# Patient Record
Sex: MALE | Race: OTHER | HISPANIC OR LATINO | URBAN - METROPOLITAN AREA
[De-identification: names, ages, dates, MRNs, and addresses within clinical notes are randomized per-mention and may not be internally consistent; named-entity substitution may affect disease eponyms.]

---

## 2023-12-15 ENCOUNTER — EMERGENCY (EMERGENCY)
Facility: HOSPITAL | Age: 43
LOS: 1 days | Discharge: HOME | End: 2023-12-15
Attending: EMERGENCY MEDICINE | Admitting: EMERGENCY MEDICINE
Payer: MEDICAID

## 2023-12-15 VITALS
HEIGHT: 62 IN | WEIGHT: 179.9 LBS | RESPIRATION RATE: 18 BRPM | HEART RATE: 63 BPM | SYSTOLIC BLOOD PRESSURE: 124 MMHG | DIASTOLIC BLOOD PRESSURE: 76 MMHG | TEMPERATURE: 98 F | OXYGEN SATURATION: 98 %

## 2023-12-15 VITALS
SYSTOLIC BLOOD PRESSURE: 108 MMHG | RESPIRATION RATE: 18 BRPM | OXYGEN SATURATION: 100 % | DIASTOLIC BLOOD PRESSURE: 71 MMHG | TEMPERATURE: 98 F | HEART RATE: 73 BPM

## 2023-12-15 DIAGNOSIS — R09.81 NASAL CONGESTION: ICD-10-CM

## 2023-12-15 DIAGNOSIS — Z20.822 CONTACT WITH AND (SUSPECTED) EXPOSURE TO COVID-19: ICD-10-CM

## 2023-12-15 DIAGNOSIS — R06.9 UNSPECIFIED ABNORMALITIES OF BREATHING: ICD-10-CM

## 2023-12-15 LAB
ANION GAP SERPL CALC-SCNC: 7 MMOL/L — SIGNIFICANT CHANGE UP (ref 5–17)
ANION GAP SERPL CALC-SCNC: 7 MMOL/L — SIGNIFICANT CHANGE UP (ref 5–17)
BASOPHILS # BLD AUTO: 0.03 K/UL — SIGNIFICANT CHANGE UP (ref 0–0.2)
BASOPHILS # BLD AUTO: 0.03 K/UL — SIGNIFICANT CHANGE UP (ref 0–0.2)
BASOPHILS NFR BLD AUTO: 0.5 % — SIGNIFICANT CHANGE UP (ref 0–2)
BASOPHILS NFR BLD AUTO: 0.5 % — SIGNIFICANT CHANGE UP (ref 0–2)
BUN SERPL-MCNC: 15 MG/DL — SIGNIFICANT CHANGE UP (ref 7–23)
BUN SERPL-MCNC: 15 MG/DL — SIGNIFICANT CHANGE UP (ref 7–23)
CALCIUM SERPL-MCNC: 9.4 MG/DL — SIGNIFICANT CHANGE UP (ref 8.4–10.5)
CALCIUM SERPL-MCNC: 9.4 MG/DL — SIGNIFICANT CHANGE UP (ref 8.4–10.5)
CHLORIDE SERPL-SCNC: 110 MMOL/L — HIGH (ref 96–108)
CHLORIDE SERPL-SCNC: 110 MMOL/L — HIGH (ref 96–108)
CO2 SERPL-SCNC: 25 MMOL/L — SIGNIFICANT CHANGE UP (ref 22–31)
CO2 SERPL-SCNC: 25 MMOL/L — SIGNIFICANT CHANGE UP (ref 22–31)
CREAT SERPL-MCNC: 0.79 MG/DL — SIGNIFICANT CHANGE UP (ref 0.5–1.3)
CREAT SERPL-MCNC: 0.79 MG/DL — SIGNIFICANT CHANGE UP (ref 0.5–1.3)
EGFR: 114 ML/MIN/1.73M2 — SIGNIFICANT CHANGE UP
EGFR: 114 ML/MIN/1.73M2 — SIGNIFICANT CHANGE UP
EOSINOPHIL # BLD AUTO: 0.12 K/UL — SIGNIFICANT CHANGE UP (ref 0–0.5)
EOSINOPHIL # BLD AUTO: 0.12 K/UL — SIGNIFICANT CHANGE UP (ref 0–0.5)
EOSINOPHIL NFR BLD AUTO: 2 % — SIGNIFICANT CHANGE UP (ref 0–6)
EOSINOPHIL NFR BLD AUTO: 2 % — SIGNIFICANT CHANGE UP (ref 0–6)
GLUCOSE SERPL-MCNC: 82 MG/DL — SIGNIFICANT CHANGE UP (ref 70–99)
GLUCOSE SERPL-MCNC: 82 MG/DL — SIGNIFICANT CHANGE UP (ref 70–99)
HCT VFR BLD CALC: 40.7 % — SIGNIFICANT CHANGE UP (ref 39–50)
HCT VFR BLD CALC: 40.7 % — SIGNIFICANT CHANGE UP (ref 39–50)
HGB BLD-MCNC: 13.9 G/DL — SIGNIFICANT CHANGE UP (ref 13–17)
HGB BLD-MCNC: 13.9 G/DL — SIGNIFICANT CHANGE UP (ref 13–17)
IMM GRANULOCYTES NFR BLD AUTO: 0.3 % — SIGNIFICANT CHANGE UP (ref 0–0.9)
IMM GRANULOCYTES NFR BLD AUTO: 0.3 % — SIGNIFICANT CHANGE UP (ref 0–0.9)
LYMPHOCYTES # BLD AUTO: 2.15 K/UL — SIGNIFICANT CHANGE UP (ref 1–3.3)
LYMPHOCYTES # BLD AUTO: 2.15 K/UL — SIGNIFICANT CHANGE UP (ref 1–3.3)
LYMPHOCYTES # BLD AUTO: 36.6 % — SIGNIFICANT CHANGE UP (ref 13–44)
LYMPHOCYTES # BLD AUTO: 36.6 % — SIGNIFICANT CHANGE UP (ref 13–44)
MCHC RBC-ENTMCNC: 29.4 PG — SIGNIFICANT CHANGE UP (ref 27–34)
MCHC RBC-ENTMCNC: 29.4 PG — SIGNIFICANT CHANGE UP (ref 27–34)
MCHC RBC-ENTMCNC: 34.2 GM/DL — SIGNIFICANT CHANGE UP (ref 32–36)
MCHC RBC-ENTMCNC: 34.2 GM/DL — SIGNIFICANT CHANGE UP (ref 32–36)
MCV RBC AUTO: 86.2 FL — SIGNIFICANT CHANGE UP (ref 80–100)
MCV RBC AUTO: 86.2 FL — SIGNIFICANT CHANGE UP (ref 80–100)
MONOCYTES # BLD AUTO: 0.73 K/UL — SIGNIFICANT CHANGE UP (ref 0–0.9)
MONOCYTES # BLD AUTO: 0.73 K/UL — SIGNIFICANT CHANGE UP (ref 0–0.9)
MONOCYTES NFR BLD AUTO: 12.4 % — SIGNIFICANT CHANGE UP (ref 2–14)
MONOCYTES NFR BLD AUTO: 12.4 % — SIGNIFICANT CHANGE UP (ref 2–14)
NEUTROPHILS # BLD AUTO: 2.83 K/UL — SIGNIFICANT CHANGE UP (ref 1.8–7.4)
NEUTROPHILS # BLD AUTO: 2.83 K/UL — SIGNIFICANT CHANGE UP (ref 1.8–7.4)
NEUTROPHILS NFR BLD AUTO: 48.2 % — SIGNIFICANT CHANGE UP (ref 43–77)
NEUTROPHILS NFR BLD AUTO: 48.2 % — SIGNIFICANT CHANGE UP (ref 43–77)
NRBC # BLD: 0 /100 WBCS — SIGNIFICANT CHANGE UP (ref 0–0)
NRBC # BLD: 0 /100 WBCS — SIGNIFICANT CHANGE UP (ref 0–0)
PLATELET # BLD AUTO: 178 K/UL — SIGNIFICANT CHANGE UP (ref 150–400)
PLATELET # BLD AUTO: 178 K/UL — SIGNIFICANT CHANGE UP (ref 150–400)
POTASSIUM SERPL-MCNC: 4 MMOL/L — SIGNIFICANT CHANGE UP (ref 3.5–5.3)
POTASSIUM SERPL-MCNC: 4 MMOL/L — SIGNIFICANT CHANGE UP (ref 3.5–5.3)
POTASSIUM SERPL-SCNC: 4 MMOL/L — SIGNIFICANT CHANGE UP (ref 3.5–5.3)
POTASSIUM SERPL-SCNC: 4 MMOL/L — SIGNIFICANT CHANGE UP (ref 3.5–5.3)
RAPID RVP RESULT: SIGNIFICANT CHANGE UP
RAPID RVP RESULT: SIGNIFICANT CHANGE UP
RBC # BLD: 4.72 M/UL — SIGNIFICANT CHANGE UP (ref 4.2–5.8)
RBC # BLD: 4.72 M/UL — SIGNIFICANT CHANGE UP (ref 4.2–5.8)
RBC # FLD: 12.9 % — SIGNIFICANT CHANGE UP (ref 10.3–14.5)
RBC # FLD: 12.9 % — SIGNIFICANT CHANGE UP (ref 10.3–14.5)
SARS-COV-2 RNA SPEC QL NAA+PROBE: SIGNIFICANT CHANGE UP
SARS-COV-2 RNA SPEC QL NAA+PROBE: SIGNIFICANT CHANGE UP
SODIUM SERPL-SCNC: 142 MMOL/L — SIGNIFICANT CHANGE UP (ref 135–145)
SODIUM SERPL-SCNC: 142 MMOL/L — SIGNIFICANT CHANGE UP (ref 135–145)
WBC # BLD: 5.88 K/UL — SIGNIFICANT CHANGE UP (ref 3.8–10.5)
WBC # BLD: 5.88 K/UL — SIGNIFICANT CHANGE UP (ref 3.8–10.5)
WBC # FLD AUTO: 5.88 K/UL — SIGNIFICANT CHANGE UP (ref 3.8–10.5)
WBC # FLD AUTO: 5.88 K/UL — SIGNIFICANT CHANGE UP (ref 3.8–10.5)

## 2023-12-15 PROCEDURE — 99283 EMERGENCY DEPT VISIT LOW MDM: CPT | Mod: 25

## 2023-12-15 PROCEDURE — 71046 X-RAY EXAM CHEST 2 VIEWS: CPT

## 2023-12-15 PROCEDURE — 85025 COMPLETE CBC W/AUTO DIFF WBC: CPT

## 2023-12-15 PROCEDURE — 71046 X-RAY EXAM CHEST 2 VIEWS: CPT | Mod: 26

## 2023-12-15 PROCEDURE — 80048 BASIC METABOLIC PNL TOTAL CA: CPT

## 2023-12-15 PROCEDURE — 99284 EMERGENCY DEPT VISIT MOD MDM: CPT

## 2023-12-15 PROCEDURE — 0225U NFCT DS DNA&RNA 21 SARSCOV2: CPT

## 2023-12-15 PROCEDURE — 36415 COLL VENOUS BLD VENIPUNCTURE: CPT

## 2023-12-15 NOTE — ED ADULT TRIAGE NOTE - BSA (M2)
Spoke with Dr. oLpez, the following was discussed...  -upon further chart review it appears patient has not been taking Guanfacine on his last admission to [facility]  --would recommend starting at a dose of 1mg daily (not previously mentioned 4mg)  -additionally, would recommend further medical work up: LFTs trending upwards on daily labs 1.83

## 2023-12-15 NOTE — ED ADULT NURSE NOTE - NSFALLUNIVINTERV_ED_ALL_ED
Bed/Stretcher in lowest position, wheels locked, appropriate side rails in place/Call bell, personal items and telephone in reach/Instruct patient to call for assistance before getting out of bed/chair/stretcher/Non-slip footwear applied when patient is off stretcher/[place] to call system/Physically safe environment - no spills, clutter or unnecessary equipment/Purposeful proactive rounding/Room/bathroom lighting operational, light cord in reach Bed/Stretcher in lowest position, wheels locked, appropriate side rails in place/Call bell, personal items and telephone in reach/Instruct patient to call for assistance before getting out of bed/chair/stretcher/Non-slip footwear applied when patient is off stretcher/[place] to call system/Physically safe environment - no spills, clutter or unnecessary equipment/Purposeful proactive rounding/Room/bathroom lighting operational, light cord in reach

## 2023-12-15 NOTE — ED PROVIDER NOTE - PATIENT PORTAL LINK FT
You can access the FollowMyHealth Patient Portal offered by [facility] by registering at the following website: [email or web address]. By joining [employer]’s FollowMyHealth portal, you will also be able to view your health information using other applications (apps) compatible with our system. You can access the FollowMyHealth Patient Portal offered by [facility] by registering at the following website: [email or web address]. By joining [email or web address]’s FollowMyHealth portal, you will also be able to view your health information using other applications (apps) compatible with our system.

## 2023-12-15 NOTE — ED PROVIDER NOTE - OBJECTIVE STATEMENT
42 y/o m with no sig pmh presents to ED with one year hx of chronic nasal congestion, waking up in middle of night with some difficulty breathing - pt tried to follow up with ENT as outpt but was unable to because he lost insurance.  Denies fever, chills.  Has been on zyrtec and other allergy medications which have minimally helped. 44 y/o m with no sig pmh presents to ED with one year hx of chronic nasal congestion, waking up in middle of night with some difficulty breathing - pt tried to follow up with ENT as outpt but was unable to because he lost insurance.  Denies fever, chills.  Has been on zyrtec and other allergy medications which have minimally helped.

## 2023-12-15 NOTE — ED ADULT NURSE NOTE - OBJECTIVE STATEMENT
Pt is 43 y.o male pt walked in c/o dysphagia and throat pian over a year now. PT was seen at an ENT doctor in [place] however pt endorses they don't take his insurance. Pt A&Ox4, conversive in full sentences and ambulates. Pt is speaking in complete sentences, no drooling or RDS noted at present. Assessment ongoing. Pt is 43 y.o male pt walked in c/o dysphagia and throat pian over a year now. PT was seen at an ENT doctor in [place] however pt endorses they don't take his insurance. Pt A&Ox4, conversive in full sentences and ambulates. Pt is speaking in complete sentences, no drooling or RDS noted at present. Assessment ongoing.

## 2023-12-15 NOTE — ED PROVIDER NOTE - NSFOLLOWUPINSTRUCTIONS_ED_ALL_ED_FT
Hill un seguimiento de los recursos para obtener un seguro, anton doc se le proporcionó en el Departamento de Emergencias. Edmund vez que obtenga el seguro, llame a nuestro coordinador de referencias para un seguimiento de otorrinolaringología (el número de teléfono figura en los documentos de honorio).    Goteo posnasal  Postnasal Drip  El goteo posnasal es la sensación de tener mucosidad que baja por la parte posterior de la garganta. La mucosidad es edmund sustancia viscosa que humedece y limpia la nariz y la garganta, así doc las cavidades de aire que se encuentran en los huesos de la rosendo cerca de la frente y las mejillas (senos). Constantemente hay pequeñas cantidades de mucosidad que pasan de la nariz y los senos hacia abajo por la parte posterior de la garganta. [place] es normal. Cuando hay edmund cantidad excesiva de mucosidad o cuando la mucosidad se torna muy espesa, usted puede sentirla.    Las siguientes son algunas causas frecuentes del goteo posnasal:  Más mucosidad debido a lo siguiente:  Un resfrío o gripe.  Alergias.  Aire frío.  Ciertos medicamentos.  Reflujo gastroesofágico.  Más mucosidad que es más espesa debido a lo siguiente:  Sinusitis o infección nasal.  Aire seco.  Alergia a algún alimento.  Siga estas indicaciones en porras casa:  Cómo aliviar el malestar    A person using nasal spray.  Hill gárgaras con edmund mezcla de agua y sal 3 o 4 veces al día, o cuando sea necesario. Para preparar agua con sal, disuelva totalmente de ½ a 1 cucharadita (de 3 a 6 g) de sal en 1 taza (237 ml) de agua tibia.  Si el aire en porras casa es seco, use un humidificador para agregarle humedad.  Use un aerosol salino o edmund lota nasal (neti pot) para juana la nariz (irrigación nasal). Estos métodos pueden ayudar a eliminar mucosidad y mantener las fosas nasales húmedas.  Indicaciones generales    Use los medicamentos de venta farooq y los recetados solamente doc se lo haya indicado el médico.  Siga las instrucciones del médico respecto de las restricciones en las comidas o las bebidas. Es posible que deba evitar la cafeína.  Evite las cosas que sabe que le producen alergia (alérgenos), doc el polvo, el moho, el polen, las mascotas o ciertos alimentos.  Danitza suficiente líquido doc para mantener la orina de color amarillo pálido.  Concurra a todas las visitas de seguimiento. [place] es importante.  Comuníquese con un médico si:  Tiene fiebre.  Presenta dolor de garganta o dificultad para tragar.  Tiene dolor de amanda.  Siente dolor en los senos paranasales o en los oídos.  Tiene tos que no desaparece.  La mucosidad de la nariz se torna espesa y es de color tana o amarillo.  Tiene síntomas de resfrío o gripe que cain más de 10 días.  Resumen  El goteo posnasal es la sensación de tener mucosidad que baja por la parte posterior de la garganta.  Use irrigación nasal o un aerosol nasal para ayudar a eliminar la mucosidad o para mantener las fosas nasales húmedas.  Evite las cosas que sabe que le producen alergia (alérgenos), doc el polvo, el moho, el polen, las mascotas o ciertos alimentos.  Esta información no tiene doc fin reemplazar el consejo del médico. Asegúrese de hacerle al médico cualquier pregunta que tenga.    Document Revised: 12/07/2022 Document Reviewed: 12/07/2022  Elsevier Patient Education © 2023 [employer]  [employer] logo  Terms and Conditions  Privacy Policy  Editorial Policy  All content on this site: Copyright © 2023 Elsevier, its licensors, and contributors. All rights are reserved, including those for text and data mining, AI training, and similar technologies. For all open access content, the Creative Commons licensing terms apply.  Cookies are used by this site. To decline or learn more, visit our Cookies page. Hill un seguimiento de los recursos para obtener un seguro, anton doc se le proporcionó en el Departamento de Emergencias. [place] vez que obtenga el seguro, llame a nuestro coordinador de referencias para un seguimiento de otorrinolaringología (el número de teléfono figura en los documentos de honorio).    Goteo posnasal  Postnasal Drip  El goteo posnasal es la sensación de tener mucosidad que baja por la parte posterior de la garganta. La mucosidad es edmund sustancia viscosa que humedece y limpia la nariz y la garganta, así doc las cavidades de aire que se encuentran en los huesos de la rosendo cerca de la frente y las mejillas (senos). Constantemente hay pequeñas cantidades de mucosidad que pasan de la nariz y los senos hacia abajo por la parte posterior de la garganta. [place] es normal. Cuando hay edmund cantidad excesiva de mucosidad o cuando la mucosidad se torna muy espesa, usted puede sentirla.    Las siguientes son algunas causas frecuentes del goteo posnasal:  Más mucosidad debido a lo siguiente:  Un resfrío o gripe.  Alergias.  Aire frío.  Ciertos medicamentos.  Reflujo gastroesofágico.  Más mucosidad que es más espesa debido a lo siguiente:  Sinusitis o infección nasal.  Aire seco.  Alergia a algún alimento.  Siga estas indicaciones en porras casa:  Cómo aliviar el malestar    A person using nasal spray.  Hill gárgaras con edmund mezcla de agua y sal 3 o 4 veces al día, o cuando sea necesario. Para preparar agua con sal, disuelva totalmente de ½ a 1 cucharadita (de 3 a 6 g) de sal en 1 taza (237 ml) de agua tibia.  Si el aire en porras casa es seco, use un humidificador para agregarle humedad.  Use un aerosol salino o edmund lota nasal (neti pot) para juana la nariz (irrigación nasal). Estos métodos pueden ayudar a eliminar mucosidad y mantener las fosas nasales húmedas.  Indicaciones generales    Use los medicamentos de venta faroqo y los recetados solamente doc se lo haya indicado el médico.  Siga las instrucciones del médico respecto de las restricciones en las comidas o las bebidas. Es posible que deba evitar la cafeína.  Evite las cosas que sabe que le producen alergia (alérgenos), doc el polvo, el moho, el polen, las mascotas o ciertos alimentos.  Danitza suficiente líquido doc para mantener la orina de color amarillo pálido.  Concurra a todas las visitas de seguimiento. [place] es importante.  Comuníquese con un médico si:  Tiene fiebre.  Presenta dolor de garganta o dificultad para tragar.  Tiene dolor de amanda.  Siente dolor en los senos paranasales o en los oídos.  Tiene tos que no desaparece.  La mucosidad de la nariz se torna espesa y es de color tana o amarillo.  Tiene síntomas de resfrío o gripe que cain más de 10 días.  Resumen  El goteo posnasal es la sensación de tener mucosidad que baja por la parte posterior de la garganta.  Use irrigación nasal o un aerosol nasal para ayudar a eliminar la mucosidad o para mantener las fosas nasales húmedas.  Evite las cosas que sabe que le producen alergia (alérgenos), doc el polvo, el moho, el polen, las mascotas o ciertos alimentos.  Esta información no tiene doc fin reemplazar el consejo del médico. Asegúrese de hacerle al médico cualquier pregunta que tenga.    Document Revised: 12/07/2022 Document Reviewed: 12/07/2022  Elsevier Patient Education © 2023 [employer]  [employer] logo  Terms and Conditions  Privacy Policy  Editorial Policy  All content on this site: Copyright © 2023 Elsevier, its licensors, and contributors. All rights are reserved, including those for text and data mining, AI training, and similar technologies. For all open access content, the Creative Commons licensing terms apply.  Cookies are used by this site. To decline or learn more, visit our Cookies page.

## 2023-12-15 NOTE — ED ADULT TRIAGE NOTE - CHIEF COMPLAINT QUOTE
42 y/o male sent from his Doctor for ENT appointment, pt states "They do not take my insurance" Pt wants to be seen today for difficulty swallowing, states has this problem for one year. Pt endorses difficulty breathing at times.

## 2023-12-15 NOTE — ED PROVIDER NOTE - CLINICAL SUMMARY MEDICAL DECISION MAKING FREE TEXT BOX
Pt with chronic nasal congestion x 1 year - unable to follow up with ENT secondary to insurance  Vitals stable  PE - nasal congestion otherwise ok  Discussed with registration how to follow up to obtain insurance  Pt aware